# Patient Record
(demographics unavailable — no encounter records)

---

## 2025-06-27 NOTE — PHYSICAL EXAM
[Awake] : awake [Alert] : alert [Acute Distress] : no acute distress [Mass] : no breast mass [Nipple Discharge] : no nipple discharge [Axillary LAD] : no axillary lymphadenopathy [Soft] : soft [Tender] : non tender [Distended] : not distended [H/Smegaly] : no hepatosplenomegaly [Oriented x3] : oriented to person, place, and time [Depressed Mood] : not depressed [Flat Affect] : affect not flat [Normal] : uterus [Labia Majora] : labia major [Labia Minora] : labia minora [No Bleeding] : there was no active vaginal bleeding [Pap Obtained] : a Pap smear was performed [Motion Tenderness] : there was no cervical motion tenderness [Uterine Adnexae] : were not tender and not enlarged [FreeTextEntry6] : no fullness, tenderness or masses on exam

## 2025-06-27 NOTE — HISTORY OF PRESENT ILLNESS
[1 Year Ago] : 1 year ago [Good] : being in good health [Healthy Diet] : a healthy diet [Weight Concerns] : no concerns with her weight [Last Pap ___] : Last cervical pap smear was [unfilled] [Reproductive Age] : is of reproductive age [Menstrual Problems] : reports normal menses [Pregnancy History] : pregnancy history: [Up to Date] : not up to date with ~his/her~ STD screening [Definite:  ___ (Date)] : the last menstrual period was [unfilled] [Normal Amount/Duration] : was of a normal amount and duration [Spotting Between  Menses] : no spotting between menses [Regular Cycle Intervals] : periods have been regular [Menstrual Cramps] : no menstrual cramps [Sexually Active] : is sexually active [Contraception] : does not use contraception

## 2025-07-03 NOTE — HEALTH RISK ASSESSMENT
[Never] : Never [NO] : No [Good] : ~his/her~  mood as  good [No] : In the past 12 months have you used drugs other than those required for medical reasons? No [No falls in past year] : Patient reported no falls in the past year [0] : 2) Feeling down, depressed, or hopeless: Not at all (0) [PHQ-2 Negative - No further assessment needed] : PHQ-2 Negative - No further assessment needed [Time Spent: ___ Minutes] : I spent [unfilled] minutes performing a depression screening for this patient. [de-identified] : active [de-identified] : balanced varied diet without restrictions  [[id number]] : 0 [Patient reported PAP Smear was abnormal] : Patient reported PAP Smear was abnormal [PapSmearDate] : 9/2023 [PapSmearComments] : needs repeat - referral given

## 2025-07-03 NOTE — PLAN
[FreeTextEntry1] : EKG obtained to assist in diagnosis and management of assessed problem(s).    EKG shows sinus rhythm without acute ischemic changes.  [ ] colposcopy overdue  [ ] omeprazole re: GERD [ ] dermatologist  re: nevus comedonicus  [ ] pap smear / colposcopy due        I spent a total of 40 minutes on the date of this encounter that EXCLUDES time spent on AWV, preventive exam, depression screening, tobacco cessation, lung cancer screening and behavioral counseling.  This additional time included: Preparing to see the patient (e.g., review of test results) Obtaining and/or reviewing separately obtained history Performing a medically appropriate exam and/or evaluation Counseling and educating the patient/family/caregiver Ordering medications, tests, procedures Referring and communicating with other healthcare professionals, when not separately reported Documenting clinical information in the health record Care coordination not separately reported

## 2025-07-03 NOTE — HISTORY OF PRESENT ILLNESS
[FreeTextEntry1] : Patient presents for comprehensive medical evaluation today.  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

## 2025-07-03 NOTE — HEALTH RISK ASSESSMENT
[Never] : Never [NO] : No [Good] : ~his/her~  mood as  good [No] : In the past 12 months have you used drugs other than those required for medical reasons? No [No falls in past year] : Patient reported no falls in the past year [0] : 2) Feeling down, depressed, or hopeless: Not at all (0) [PHQ-2 Negative - No further assessment needed] : PHQ-2 Negative - No further assessment needed [Time Spent: ___ Minutes] : I spent [unfilled] minutes performing a depression screening for this patient. [de-identified] : active [de-identified] : balanced varied diet without restrictions  [[id number]] : 0 [Patient reported PAP Smear was abnormal] : Patient reported PAP Smear was abnormal [PapSmearDate] : 9/2023 [PapSmearComments] : needs repeat - referral given

## 2025-07-03 NOTE — HISTORY OF PRESENT ILLNESS
[FreeTextEntry1] : Patient presents for comprehensive medical evaluation today.  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